# Patient Record
Sex: MALE | Race: BLACK OR AFRICAN AMERICAN | NOT HISPANIC OR LATINO | URBAN - METROPOLITAN AREA
[De-identification: names, ages, dates, MRNs, and addresses within clinical notes are randomized per-mention and may not be internally consistent; named-entity substitution may affect disease eponyms.]

---

## 2019-03-01 ENCOUNTER — EMERGENCY (EMERGENCY)
Facility: HOSPITAL | Age: 49
LOS: 0 days | Discharge: HOME | End: 2019-03-01
Attending: EMERGENCY MEDICINE
Payer: MEDICAID

## 2019-03-01 VITALS — OXYGEN SATURATION: 99 % | RESPIRATION RATE: 20 BRPM

## 2019-03-01 VITALS
DIASTOLIC BLOOD PRESSURE: 91 MMHG | OXYGEN SATURATION: 98 % | SYSTOLIC BLOOD PRESSURE: 125 MMHG | RESPIRATION RATE: 18 BRPM | HEART RATE: 91 BPM | TEMPERATURE: 98 F

## 2019-03-01 DIAGNOSIS — F32.9 MAJOR DEPRESSIVE DISORDER, SINGLE EPISODE, UNSPECIFIED: ICD-10-CM

## 2019-03-01 DIAGNOSIS — F91.9 CONDUCT DISORDER, UNSPECIFIED: ICD-10-CM

## 2019-03-01 DIAGNOSIS — F10.129 ALCOHOL ABUSE WITH INTOXICATION, UNSPECIFIED: ICD-10-CM

## 2019-03-01 DIAGNOSIS — F17.200 NICOTINE DEPENDENCE, UNSPECIFIED, UNCOMPLICATED: ICD-10-CM

## 2019-03-01 DIAGNOSIS — Z98.890 OTHER SPECIFIED POSTPROCEDURAL STATES: Chronic | ICD-10-CM

## 2019-03-01 PROCEDURE — 99284 EMERGENCY DEPT VISIT MOD MDM: CPT

## 2019-03-01 NOTE — ED ADULT TRIAGE NOTE - CHIEF COMPLAINT QUOTE
BIBA for alcohol intoxication. as per EMS " pt was on the bus and  was unable to wake pt." pt very combative at triage. refusing vital signs at triage. unknown medical or psych history.

## 2019-03-01 NOTE — ED ADULT NURSE NOTE - OBJECTIVE STATEMENT
Patient brought in by EMS after he was found sleeping on public bus. Pt became agitated and uncooperative. Brought to ED for concern of alcohol intoxication or other elicit drug use. Patient became cooperative once settled in ED.

## 2019-03-01 NOTE — ED ADULT NURSE NOTE - NSIMPLEMENTINTERV_GEN_ALL_ED
Implemented All Fall Risk Interventions:  [place] to call system. Call bell, personal items and telephone within reach. Instruct patient to call for assistance. Room bathroom lighting operational. Non-slip footwear when patient is off stretcher. Physically safe environment: no spills, clutter or unnecessary equipment. Stretcher in lowest position, wheels locked, appropriate side rails in place. Provide visual cue, wrist band, yellow gown, etc. Monitor gait and stability. Monitor for mental status changes and reorient to person, place, and time. Review medications for side effects contributing to fall risk. Reinforce activity limits and safety measures with patient and family.

## 2019-03-01 NOTE — ED PROVIDER NOTE - OBJECTIVE STATEMENT
here with ETOH intox BIB [employer] for beligerent behavior on [employer] bus .      Appropriate in ED admitting to ETOH    denied SI/HI

## 2024-01-04 ENCOUNTER — EMERGENCY (EMERGENCY)
Facility: HOSPITAL | Age: 54
LOS: 1 days | Discharge: HOME | End: 2024-01-04
Attending: EMERGENCY MEDICINE | Admitting: EMERGENCY MEDICINE
Payer: MEDICAID

## 2024-01-04 VITALS
TEMPERATURE: 97 F | DIASTOLIC BLOOD PRESSURE: 85 MMHG | RESPIRATION RATE: 16 BRPM | SYSTOLIC BLOOD PRESSURE: 132 MMHG | HEART RATE: 104 BPM | OXYGEN SATURATION: 98 %

## 2024-01-04 PROCEDURE — 99284 EMERGENCY DEPT VISIT MOD MDM: CPT

## 2024-01-04 RX ORDER — HALOPERIDOL DECANOATE 100 MG/ML
5 INJECTION INTRAMUSCULAR ONCE
Refills: 0 | Status: COMPLETED | OUTPATIENT
Start: 2024-01-04 | End: 2024-01-04

## 2024-01-04 RX ADMIN — Medication 2 MILLIGRAM(S): at 21:10

## 2024-01-04 RX ADMIN — HALOPERIDOL DECANOATE 5 MILLIGRAM(S): 100 INJECTION INTRAMUSCULAR at 21:10

## 2024-01-04 NOTE — ED PROVIDER NOTE - IV ALTEPLASE ADMIN OUTSIDE HIDDEN
Patient states his right knee has been giving him problems for years.  Today he fell in shower because his knee gave out.    show

## 2024-01-04 NOTE — ED PROVIDER NOTE - CLINICAL SUMMARY MEDICAL DECISION MAKING FREE TEXT BOX
53-year male with unknown past medical history BIBEMS for severe agitation at a deli no s/p haldol and ativan. . Concern for acute substance intoxication.

## 2024-01-04 NOTE — ED PROVIDER NOTE - PRO INTERPRETER NEED 2
Is This A New Presentation, Or A Follow-Up?: Skin Lesion
What Type Of Note Output Would You Prefer (Optional)?: Bullet Format
English
How Severe Is Your Skin Lesion?: moderate

## 2024-01-04 NOTE — ED PROVIDER NOTE - PROGRESS NOTE DETAILS
Patient reassessed doing comfortably in stretcher.  Will continue to reassess. 71-year-old male with a history of CAD s/p multiple stents, CKD 3, CVA, T2DM, HTN, HLD, seizure history presents with shakiness and tachycardia for the last 2 to 3 days.

## 2024-01-04 NOTE — ED PROVIDER NOTE - ATTENDING CONTRIBUTION TO CARE
Seen and examined, discussed w resident, agitated [employer], [employer] and EMS called to [employer]. Pt in ED initially cooperative but then hostile and threatening. Transported to  area. No signs of trauma/injury. MMM, clear lungs heart reg, abd soft, NT to palp, no CVAT, no edema, NT calves.

## 2024-01-04 NOTE — ED ADULT TRIAGE NOTE - CHIEF COMPLAINT QUOTE
Pt arrives to ED from a deli being verbally abusive and aggressive with deli workers.  Pt reports he has been drinking all day.  pt denies S/I/ H/I or hallucinations.  Pt has to be redirected to sit.  Pt gets up and wanders during triage.  Pt is apologetic and then becomes verbally abusive.  No signs of injury observed.  Pt denies medical history.  fs = 111 by EMS.  Pt changing in Bh

## 2024-01-04 NOTE — ED PROVIDER NOTE - OBJECTIVE STATEMENT
53-year male with unknown past medical history [facility]  Patient was brought into [facility] bay and redirectable.  Once arriving in  he began being combative with hospital staff.  No SI or HI.  Patient given Haldol and Ativan in behavioral health and brought to the main ED for further monitoring. 53-year male with unknown past medical history [facility]  Patient was brought into [facility] bay and redirectable.  Once arriving in  he began being combative with hospital staff.  No SI or HI.  Patient given Haldol and Ativan in behavioral health and brought to the main ED for further monitoring.

## 2024-01-04 NOTE — ED PROVIDER NOTE - PHYSICAL EXAMINATION
GENERAL: resting in stretcher  HEAD: normocephalic, atraumatic  HEENT:  oral mucosa moist  CARDIAC: regular rate and rhythm, normal S1S2, no appreciable murmurs  PULM: normal breath sounds, clear to ascultation bilaterally, no rales, rhonchi, wheezing  GI: abdomen nondistended, soft, nontender  : no suprapubic tenderness  NEURO: moving all 4 extremities  MSK: no peripheral edema  SKIN:  extremities warm

## 2024-01-04 NOTE — ED PROVIDER NOTE - PATIENT PORTAL LINK FT
You can access the FollowMyHealth Patient Portal offered by [facility] by registering at the following website: [email or web address]. By joining [employer]’s FollowMyHealth portal, you will also be able to view your health information using other applications (apps) compatible with our system. You can access the FollowMyHealth Patient Portal offered by [facility] by registering at the following website: [email or web address]. By joining [email or web address]’s FollowMyHealth portal, you will also be able to view your health information using other applications (apps) compatible with our system.

## 2024-01-04 NOTE — ED ADULT NURSE NOTE - NSFALLUNIVINTERV_ED_ALL_ED
Bed/Stretcher in lowest position, wheels locked, appropriate side rails in place/Call bell, personal items and telephone in reach/Instruct patient to call for assistance before getting out of bed/chair/stretcher/Non-slip footwear applied when patient is off stretcher/[place] to call system/Physically safe environment - no spills, clutter or unnecessary equipment/Purposeful proactive rounding/Room/bathroom lighting operational, light cord in reach Bed/Stretcher in lowest position, wheels locked, appropriate side rails in place/Call bell, personal items and telephone in reach/Instruct patient to call for assistance before getting out of bed/chair/stretcher/Non-slip footwear applied when patient is off stretcher/[place] to call system/Physically safe environment - no spills, clutter or unnecessary equipment/Purposeful proactive rounding/Room/bathroom lighting operational, light cord in reach

## 2024-01-04 NOTE — ED PROVIDER NOTE - NSFOLLOWUPINSTRUCTIONS_ED_ALL_ED_FT
Today in the emergency department you were treated for intoxication.    Please follow up with your primary care physician within 1-2 weeks of discharge from the emergency department.  Please bring a copy of your results with you.  Please return to the emergency department for worsening of your symptoms.    You may take Acetaminophen over the counter as needed for pain and/or fever. Use as directed and see medication warnings.  You may take Ibuprofen over the counter as needed for pain and/or fever. Use as directed and see medication warnings.

## 2024-01-04 NOTE — ED ADULT NURSE NOTE - OBJECTIVE STATEMENT
unknown Hx. Pt arrives with EMS from  a deli where Pt was aggressive and pushing people inside. Pt arrives seemingly intoxicated and potentially under influence of drugs. Pt refusing to cooperate, refusing to answer questions and threatening staff. Pt keeps alluding to his girlfriend who is not present during exam. Pt threatening to "fu** staff up" and refusing to change. Pt medicated as per MD order and manually undressed by staff. Pt to be moved to main ED after sedation.

## 2024-01-05 VITALS
HEART RATE: 60 BPM | TEMPERATURE: 97 F | RESPIRATION RATE: 20 BRPM | OXYGEN SATURATION: 100 % | DIASTOLIC BLOOD PRESSURE: 63 MMHG | SYSTOLIC BLOOD PRESSURE: 115 MMHG